# Patient Record
Sex: MALE | Race: WHITE | NOT HISPANIC OR LATINO | URBAN - METROPOLITAN AREA
[De-identification: names, ages, dates, MRNs, and addresses within clinical notes are randomized per-mention and may not be internally consistent; named-entity substitution may affect disease eponyms.]

---

## 2018-05-23 ENCOUNTER — RECORDS - HEALTHEAST (OUTPATIENT)
Dept: LAB | Facility: CLINIC | Age: 28
End: 2018-05-23

## 2018-05-23 LAB — RHEUMATOID FACT SERPL-ACNC: <15 IU/ML (ref 0–30)

## 2018-05-24 LAB
ANA SER QL: 0.4 U
CCP AB SER IA-ACNC: <0.5 U/ML

## 2018-05-25 LAB
A PHAGOCYTOPH DNA BLD QL NAA+PROBE: NEGATIVE
E CHAFFEENSIS DNA BLD QL NAA+PROBE: NEGATIVE
E EWINGII DNA SPEC QL NAA+PROBE: NEGATIVE
EHRLICHIA DNA SPEC QL NAA+PROBE: NEGATIVE

## 2018-05-26 LAB
B MICROTI DNA BLD QL NAA+PROBE: NOT DETECTED
BABESIA DNA BLD QL NAA+PROBE: NOT DETECTED

## 2018-06-01 ENCOUNTER — RECORDS - HEALTHEAST (OUTPATIENT)
Dept: LAB | Facility: CLINIC | Age: 28
End: 2018-06-01

## 2018-06-01 LAB
C REACTIVE PROTEIN LHE: 2.7 MG/DL (ref 0–0.8)
CK SERPL-CCNC: 49 U/L (ref 30–190)
ERYTHROCYTE [SEDIMENTATION RATE] IN BLOOD BY WESTERGREN METHOD: 25 MM/HR (ref 0–15)
TSH SERPL DL<=0.005 MIU/L-ACNC: 1.65 UIU/ML (ref 0.3–5)

## 2018-06-05 ENCOUNTER — AMBULATORY - HEALTHEAST (OUTPATIENT)
Dept: RHEUMATOLOGY | Facility: CLINIC | Age: 28
End: 2018-06-05

## 2018-06-22 ENCOUNTER — OFFICE VISIT - HEALTHEAST (OUTPATIENT)
Dept: RHEUMATOLOGY | Facility: CLINIC | Age: 28
End: 2018-06-22

## 2018-06-22 ENCOUNTER — RECORDS - HEALTHEAST (OUTPATIENT)
Dept: GENERAL RADIOLOGY | Facility: CLINIC | Age: 28
End: 2018-06-22

## 2018-06-22 DIAGNOSIS — M25.50 PAIN IN UNSPECIFIED JOINT: ICD-10-CM

## 2018-06-22 DIAGNOSIS — M19.90 INFLAMMATORY ARTHRITIS: ICD-10-CM

## 2018-06-22 DIAGNOSIS — M25.50 MULTIPLE JOINT PAIN: ICD-10-CM

## 2018-06-22 ASSESSMENT — MIFFLIN-ST. JEOR: SCORE: 2008.59

## 2018-06-28 ENCOUNTER — COMMUNICATION - HEALTHEAST (OUTPATIENT)
Dept: RHEUMATOLOGY | Facility: CLINIC | Age: 28
End: 2018-06-28

## 2018-07-06 ENCOUNTER — AMBULATORY - HEALTHEAST (OUTPATIENT)
Dept: LAB | Facility: CLINIC | Age: 28
End: 2018-07-06

## 2018-07-06 DIAGNOSIS — M25.50 MULTIPLE JOINT PAIN: ICD-10-CM

## 2018-07-06 DIAGNOSIS — M19.90 INFLAMMATORY ARTHRITIS: ICD-10-CM

## 2018-07-06 LAB
C REACTIVE PROTEIN LHE: 5.6 MG/DL (ref 0–0.8)
ERYTHROCYTE [SEDIMENTATION RATE] IN BLOOD BY WESTERGREN METHOD: 34 MM/HR (ref 0–15)
URATE SERPL-MCNC: 3.1 MG/DL (ref 3–8)

## 2018-07-07 LAB
ACE SERPL-CCNC: 45 U/L (ref 9–67)
ANCA IGG TITR SER IF: NORMAL {TITER}

## 2018-07-09 ENCOUNTER — COMMUNICATION - HEALTHEAST (OUTPATIENT)
Dept: ADMINISTRATIVE | Facility: CLINIC | Age: 28
End: 2018-07-09

## 2018-07-09 DIAGNOSIS — M25.50 MULTIPLE JOINT PAIN: ICD-10-CM

## 2018-07-10 LAB — DNA (DS) ANTIBODY - HISTORICAL: 3 IU

## 2018-07-11 LAB
B LOCUS: NORMAL
B27TEST METHOD: NORMAL

## 2018-07-12 ENCOUNTER — COMMUNICATION - HEALTHEAST (OUTPATIENT)
Dept: RHEUMATOLOGY | Facility: CLINIC | Age: 28
End: 2018-07-12

## 2018-07-31 ENCOUNTER — OFFICE VISIT - HEALTHEAST (OUTPATIENT)
Dept: RHEUMATOLOGY | Facility: CLINIC | Age: 28
End: 2018-07-31

## 2018-07-31 DIAGNOSIS — R70.0 SEDIMENTATION RATE ELEVATION: ICD-10-CM

## 2018-07-31 DIAGNOSIS — R05.8 OCCASIONAL COUGH: ICD-10-CM

## 2018-07-31 DIAGNOSIS — R22.9 SUBCUTANEOUS NODULE: ICD-10-CM

## 2018-07-31 DIAGNOSIS — Z72.0 TOBACCO ABUSE: ICD-10-CM

## 2018-07-31 DIAGNOSIS — M19.90 INFLAMMATORY ARTHRITIS: ICD-10-CM

## 2018-07-31 RX ORDER — CELECOXIB 200 MG/1
CAPSULE ORAL
Qty: 30 CAPSULE | Refills: 2 | Status: SHIPPED | OUTPATIENT
Start: 2018-07-31

## 2018-07-31 ASSESSMENT — MIFFLIN-ST. JEOR: SCORE: 2008.59

## 2021-06-01 VITALS — BODY MASS INDEX: 30.48 KG/M2 | WEIGHT: 225 LBS | HEIGHT: 72 IN

## 2021-06-01 VITALS — WEIGHT: 225 LBS | HEIGHT: 72 IN | BODY MASS INDEX: 30.48 KG/M2

## 2021-06-18 NOTE — PROGRESS NOTES
Benny Simmons who presents today with a chief complaint of consult, joint pains      Joint Pains: Yes  Location:  Wrists, knee's. prior also included ankles, feet  Onset: 6 weeks  Intensity:  4/10  AM Stiffness: Minutes  Alleviating/Aggravating Factors: Climbing stairs can worsen pains. Prednisone helpful reduces pain by 100%  Tolerating Meds: yes  Other:       ROS:  Patient denies having any dry eyes, dry mouth, oral ulcers, alopecia, rashes, photosensitivity, history of psoriasis, chest pain, shortness of breath, cough, dysuria, history of kidney stones, abdominal pain, diarrhea, hematochezia, dysphagia, history of peptic ulcer disease, history of HIV, tuberculosis, hepatitis B or C, Lyme disease, seizure history, raynaud's, fevers, recent infections,  involuntary weight loss, low back stiffness, fatigue, depression, anxiety, loss of appetite, + mild, occasioanal self limitnig numbness and tingling in legs,  recurrent sinusitis,  double vision, loss of vision or painful vision.      Problem List:  There is no problem list on file for this patient.       PMH:   No past medical history on file.    Surgical History:  No past surgical history on file.    Family History:  No family history on file.    Social History:   reports that he has been smoking.  He uses smokeless tobacco.    Allergies:  No Known Allergies     Current Medications:  Current Outpatient Prescriptions   Medication Sig Dispense Refill     predniSONE (DELTASONE) 20 MG tablet TAKE 2 TABLETS BY MOUTH ONCE DAILY FOR 5 DAYS THEN 1 TABLET DAILY FOR 5 DAYS  0     indomethacin (INDOCIN) 50 MG capsule Take 1 capsule (50 mg total) by mouth 3 (three) times a day with meals. 20 capsule 0     No current facility-administered medications for this visit.            Physical Exam:  /80 (Patient Site: Left Arm, Patient Position: Sitting, Cuff Size: Adult Large)  Pulse 88  Ht 6' (1.829 m)  Wt (!) 225 lb (102.1 kg)  SpO2 98%  BMI 30.52 kg/m2  General: A & O  x 3 in NAD  HEENT: EOMI, Non injected/non icteric sclera, no oral lesions noted  Neck: Supple, no cervical LAD or thyromegaly noted  Derm: No malar rash, psoriatic lesions or nail pitting appreciated  CV: s1s2 with RRR, no rubs appreciated   Lungs: CTA B/L, no wheezing , rales or rhonci appreciated  GI: Soft, NT/ND, no rebound, no guarding noted, no hepatomegally appreciated  MS: Passive range of motion testing and palpation of wrists did not reproduce any pains, no clear signs of synovitis noted.  Subtle fullness noted involving knees bilaterally, nontender to palpation or passive range of motion testing.  Passive range of motion testing of ankles did not reproduce any pains, has some focal discomfort over right distal Achilles on palpation, no warmth, erythema or fullness noted. Otherwise patient demonstrated good passive/active ROM over other joints with no warmth, erythema, tenderness or synovitis noted over these joints.  Back: negative straight leg raising  Neuro: 5/5 strength in upper and lower extremities b/l, good sensation b/l,  2+ bicep and patella reflexes b/l      Summary/Assessment:    27-year-old male presents with complaints of multiple joint pains.      Patient explains that he was fine up until about 6 weeks ago when he developed ankle/foot and distal lower extremity pains.    Prednisone taper provided starting at 40 mg twice a day for 5 days thereafter 20 mg daily for 5 days and this provided 100% relief.  However soon after completing taper symptoms resurface.  Since then he has tried a second similar taper and lately a third similar taper provided.  Patient adds that he held prednisone the past couple days so his symptoms may resurface for this visit.      Every time he  has taken prednisone, he has experienced 100% relief.    In addition to ankles and feet patient lately has been experiencing bilateral knee and bilateral wrist pains.    On exam today some subtle fullness noted involving knees  with no warmth or erythema or tenderness.  Some mild discomfort involving right Achilles distally on palpation.    Patient shows a picture on his phone of dorsum of left foot/lateral ankle being erythematous and swollen.    Patient denies regular alcohol beverage intake over does have some liquor about once a month with his friend sometimes can have as much as 10 drinks in one setting.    Noted to have negative/unremarkable: Lyme test, SAMMY, rheumatoid factor, CCP antibody.    ESR and CRP levels were noted to be elevated.    Given the above patient appears to have an inflammatory arthritis with possible enthesitis.    Gout is in the differential given occasional binge drinking.      Seronegative RA also a consideration given symmetrical involvement with small joints affected.    Since it has only been about 6 weeks a viral reactive arthritis is also a consideration however this would be low likelihood if symptoms persist/resurface beyond this timeframe.    Please see below for management plan.      Pertinent rheumatology/past medical history (please refer to above for more detailed history):      Inflammatory arthritis     Multiple joint pains (ankle/feet, knees and wrists)    Alcohol beverage intake (binge drinking, on avg qmonth)    Smoking history (1 pack per day)      Rheumatology medications provided/suggested:    Ibuprofen as needed      Pertinent medication from other providers or from otc (please refer to above for more detailed med list):    Prednisone taper      Pertinent medications already tried:     Indocin 50 mg 3 times daily   Doxycycline      Pertinent lab history:    Elevated ESR/CRP levels.    Noted to have negative/unremarkable: Lyme PCR test, rheumatoid factor, CCP antibody, SAMMY, TSH, CK.    Pertinent imaging/test history:        Other:    Works for [employer], requires some lifting.    Plan:      Made aware to complete a prednisone taper thereafter if has a flare recommend he have labs performed  (orders placed) thereafter he can try taking ibuprofen 800 mg 3 times daily as needed.  If insufficient relief with ibuprofen he can contact us to consider alternate anti-inflammatory versus another prednisone course.  This was outlined on his AVS.    We will x-ray his ankles and feet today.    Follow-up in 1-2 months.      Procedure note:       Spent 60 minutes with greater than half of this time spent with the patient going over differential diagnosis, prognosis, treatment plan, medication side effects and  answering questions.    Major side effect profile of medications provided/suggested were discussed with the patient.    This note was transcribed using Dragon voice recognition software as a result unintentional grammatical errors or word substitutions may have occurred. Please contact our Rheumatology department if you need any clarification or if you have any related inquiries.    Thank you for referring this patient to our clinic.      Eduardo Slaughter ....................  6/22/2018   2:16 PM

## 2021-06-19 NOTE — PROGRESS NOTES
Benny Simmons who presents today with a chief complaint of  Follow-up      Joint Pains: Yes  Location: left lower extremity, left ankle/foot  Onset: May 2018  Intensity: 1 /10, was 8/10  Two wks ago.  AM Stiffness: Minutes  Alleviating/Aggravating Factors:  Tolerating Meds: yes  Other:      ROS:  Patient denies having any chest pain, shortness of breath, cough, abdominal pain, nausea, vomiting, rashes, fevers, oral ulcers and recent infections.  Patient admits to having a good appetite      Problem List:  There is no problem list on file for this patient.       PMH:   No past medical history on file.    Surgical History:  No past surgical history on file.    Family History:  No family history on file.    Social History:   reports that he has been smoking.  He uses smokeless tobacco.    Allergies:  No Known Allergies     Current Medications:  No current outpatient prescriptions on file.     No current facility-administered medications for this visit.            Physical Exam:  /88  Pulse 82  Resp 10  Ht 6' (1.829 m)  Wt (!) 225 lb (102.1 kg)  SpO2 98%  BMI 30.52 kg/m2  General: A & O x 3 in NAD  HEENT: EOMI, Non injected/non icteric sclera, no oral lesions noted  CV: s1s2 with RRR, no rubs appreciated   Lungs: CTA B/L, no wheezing , rales or rhonci appreciated  GI: Soft, NT/ND, no rebound, no guarding noted  MS: Passive range of motion testing of hips, knees, ankles and foot joints did not reproduce any pains, no synovitis noted over these joints.  Reproducible tenderness on palpation noted involving distal, medial left lower extremity subcutaneous nodule, nonerythematous.  Otherwise patient demonstrated good passive/active ROM over other joints with no warmth, erythema, tenderness or synovitis noted over these joints.          Summary/Assessment:    History that includes multiple joint pains, elevated ESR/CRP levels.    Presents for a follow-up visit and states currently doing significantly better  "compared to 2+ weeks ago when had muscle aches and joint pains mainly involving lower extremities (thighs, knees, ankles, feet).  Also had some  discomfort involving right wrist and right elbow that was self-limiting after few days.    Patient called and we prescribed Voltaren however did not find beneficial so he reverted back to taking ibuprofen which was also not helpful however eventually for unknown reason states symptoms began to improve a couple weeks ago.    Has noticed onset of distal left lower extremity subcutaneous nodule that is tender to palpation.  Had reproducible tenderness over subtle notable nodule on exam.    Patient had labs performed when having a flare and did have a bit of a spike of his ESR/CRP levels which were already elevated in the past.    Otherwise autoimmune workup to date has been unremarkable.    Given the above, it is difficult to tell at this time as to what is contributing to his migratory joint pains with elevated ESR/CRP levels.    Was binge drinking alcoholic beverages in the past, states lately has not been drinking and uric acid level noted to be within normal limits.    Seronegative palindromic rheumatism is in the differential.      Given subcutaneous nodule which may be related to erythema nodosum (although atypical as not erythematous) sarcoid is also a consideration however noted to have normal ACE level.      Patient is a smoker and has a cough in the morning \" a smoker's cough\"' however has not noticed any change in pattern of this cough.  Denies having any constitutional symptoms.     Denies having any oral or genital ulcers or URI/sore throat symptoms prior to onset of subcutaneous nodule.    Please see below for management plan.      From prior note:    Patient explains that he was fine up until about 6 weeks ago when he developed ankle/foot and distal lower extremity pains.    Every time he  has taken prednisone, he has experienced 100% relief.    In addition to " ankles and feet patient lately has been experiencing bilateral knee and bilateral wrist pains.    On exam some subtle fullness noted involving knees with no warmth or erythema or tenderness.  Some mild discomfort involving right Achilles distally on palpation.    Patient shows a picture on his phone of dorsum of left foot/lateral ankle being erythematous and swollen.      Pertinent rheumatology/past medical history (please refer to above for more detailed history):      Inflammatory arthritis     Elevated ESR/CRP levels    Multiple joint pains (ankle/feet, knees and wrists)    Subcutaneous skin nodule (distal left lower extremity)    Alcohol beverage intake (binge drinking, on avg qmonth)    Smoking history (1 pack per day)      Rheumatology medications provided/suggested:    Celebrex      Pertinent medication from other providers or from otc (please refer to above for more detailed med list):          Pertinent medications already tried:     Prednisone taper (effective)  Ibuprofen (mildly effective)  Indocin 50 mg 3 times daily   Doxycycline  Voltaren, oral (ineffective)      Pertinent lab history:    Elevated ESR/CRP levels.    Noted to have negative/unremarkable: Lyme PCR test, rheumatoid factor, CCP antibody, SAMMY, double-stranded DNA, HLA-B27, uric acid, ACE level, ANCA, TSH, CK.    Pertinent imaging/test history:    From June 2018:   X-rays of ankles and feet were unremarkable.    Other:    Works for [employer], requires some lifting.    Plan:      Given subcutaneous nodule, we will obtain a chest x-ray to rule out sarcoid and also given smoking history with morning cough.    We will refer to dermatology to biopsy left lower extremity nodule.  Made aware to avoid taking steroids prior to biopsy.    We will also obtain some additional labs and correlate clinically.    Had lengthy discussion with the patient regarding possible trial of Plaquenil for palindromic rheumatism if labs and chest x-ray are unrevealing.   In the interim we will try trial of Celebrex 200 mg daily as needed, will discontinue if exacerbates subcutaneous nodules.  Made aware to hold NSAIDs.    Also discussed the possibility of obtaining a bone scan if above workup is unremarkable and symptoms resurface.    Suggested he follow-up in 6 weeks however patient for financial reasons desires to postpone follow-up appointment for 3 months instead.  Also made aware that he can contact us if develops a flare.    Procedure note:       Spent 40 minutes with greater than half of this time spent with the patient going over differential diagnosis, prognosis, treatment plan, medication side effects and  answering questions.    Major side effect profile of medications provided/suggested were discussed with the patient.    This note was transcribed using Dragon voice recognition software as a result unintentional grammatical errors or word substitutions may have occurred. Please contact our Rheumatology department if you need any clarification or if you have any related inquiries.      Eduardo Slaughter ....................  7/31/2018   5:09 PM